# Patient Record
Sex: FEMALE | Race: WHITE | NOT HISPANIC OR LATINO | Employment: FULL TIME | URBAN - METROPOLITAN AREA
[De-identification: names, ages, dates, MRNs, and addresses within clinical notes are randomized per-mention and may not be internally consistent; named-entity substitution may affect disease eponyms.]

---

## 2022-06-22 ENCOUNTER — HOSPITAL ENCOUNTER (EMERGENCY)
Facility: HOSPITAL | Age: 22
Discharge: HOME | End: 2022-06-22
Attending: EMERGENCY MEDICINE | Admitting: EMERGENCY MEDICINE

## 2022-06-22 VITALS
DIASTOLIC BLOOD PRESSURE: 90 MMHG | OXYGEN SATURATION: 98 % | BODY MASS INDEX: 29.71 KG/M2 | HEIGHT: 64 IN | RESPIRATION RATE: 18 BRPM | WEIGHT: 174 LBS | SYSTOLIC BLOOD PRESSURE: 121 MMHG | TEMPERATURE: 98.2 F | HEART RATE: 105 BPM

## 2022-06-22 DIAGNOSIS — L89.219 PRESSURE INJURY OF SKIN OF RIGHT THIGH, UNSPECIFIED INJURY STAGE: Primary | ICD-10-CM

## 2022-06-22 PROCEDURE — 99282 EMERGENCY DEPT VISIT SF MDM: CPT
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
## 2022-06-30 ENCOUNTER — HOSPITAL ENCOUNTER (OUTPATIENT)
Dept: PHYSICAL THERAPY | Facility: HOSPITAL | Age: 22
Setting detail: THERAPIES SERIES
Discharge: HOME | End: 2022-06-30

## 2022-06-30 DIAGNOSIS — S71.101D OPEN WOUND OF RIGHT THIGH, SUBSEQUENT ENCOUNTER: Primary | ICD-10-CM

## 2022-06-30 PROCEDURE — 97597 DBRDMT OPN WND 1ST 20 CM/<: CPT

## 2022-06-30 PROCEDURE — 97161 PT EVAL LOW COMPLEX 20 MIN: CPT

## 2022-09-26 ENCOUNTER — DOCUMENTATION (OUTPATIENT)
Dept: PHYSICAL THERAPY | Facility: HOSPITAL | Age: 22
End: 2022-09-26

## 2022-09-26 DIAGNOSIS — S71.101D OPEN WOUND OF RIGHT THIGH, SUBSEQUENT ENCOUNTER: Primary | ICD-10-CM

## 2022-09-26 NOTE — THERAPY DISCHARGE NOTE
Outpatient Rehabilitation - Wound/Debridement Discharge Summary       Patient Name: Muna Girard  : 2000  MRN: [id number]  Today's Date: 2022                  Admit Date: (Not on file)    Visit Dx:    ICD-10-CM ICD-9-CM   1. Open wound of right thigh, subsequent encounter  S71.101D V58.89     890.0       There is no problem list on file for this patient.       No past medical history on file.     Past Surgical History:   Procedure Laterality Date   • TONSILLECTOMY     • WISDOM TOOTH EXTRACTION       Goals   PT OP Goals     Row Name 22 0900          PT Short Term Goals    STG 1 Patient to verbalize S&S of infection and when to seek medical attention.  -     STG 1 Progress Not Met  -     STG 2 Reduce necrotic tissue on wound by 50% to facilitate wound healing.  -     STG 2 Progress Not Met  -     STG 3 Reduce wound dimensions by 50% as evidence of wound healing.  -     STG 3 Progress Not Met  -            Long Term Goals    LTG Date to Achieve 22  -     LTG 1 Patient to be independent with home dressing changes.  -     LTG 1 Progress Not Met  -     LTG 2 Reduce necrotic tissue on wound bed by 75%  to facilitate wound healing.  -     LTG 2 Progress Not Met  -     LTG 3 Right thigh wound to be closed without drainage to facilitate return to prior activities.  -     LTG 3 Progress Not Met  -           User Key  (r) = Recorded By, (t) = Taken By, (c) = Cosigned By    Initials Name Provider Type    Jasmin Tomlin, PT Physical Therapist                   OP Discharge Summary     Row Name 22 0936             OP PT Discharge Summary    Date of Discharge 22  -      Reason for Discharge other (comment)  Patient did not return for follow-up after initial evaluation  -      Outcomes Achieved Unable to make functional progress toward goals at this time  -      Discharge Destination Unknown  -            User Key  (r) = Recorded By, (t)  = Taken By, (c) = Cosigned By    Initials Name Provider Type    Jasmin Tomlin, PT Physical Therapist                Jasmin Gomze, PT  9/26/2022